# Patient Record
Sex: MALE | Race: WHITE
[De-identification: names, ages, dates, MRNs, and addresses within clinical notes are randomized per-mention and may not be internally consistent; named-entity substitution may affect disease eponyms.]

---

## 2019-07-14 ENCOUNTER — HOSPITAL ENCOUNTER (EMERGENCY)
Dept: HOSPITAL 74 - JER | Age: 59
Discharge: HOME | End: 2019-07-14
Payer: COMMERCIAL

## 2019-07-14 VITALS — SYSTOLIC BLOOD PRESSURE: 118 MMHG | HEART RATE: 71 BPM | DIASTOLIC BLOOD PRESSURE: 77 MMHG

## 2019-07-14 VITALS — BODY MASS INDEX: 30.4 KG/M2

## 2019-07-14 VITALS — TEMPERATURE: 98.6 F
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
## 2019-07-14 DIAGNOSIS — S89.82XA: Primary | ICD-10-CM
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
## 2019-07-14 DIAGNOSIS — S99.821A: ICD-10-CM

## 2019-07-14 LAB
ALBUMIN SERPL-MCNC: 3.2 G/DL (ref 3.4–5)
ALP SERPL-CCNC: 86 U/L (ref 45–117)
ALT SERPL-CCNC: 77 U/L (ref 13–61)
ANION GAP SERPL CALC-SCNC: 7 MMOL/L (ref 8–16)
AST SERPL-CCNC: 35 U/L (ref 15–37)
BASOPHILS # BLD: 0.4 % (ref 0–2)
BILIRUB SERPL-MCNC: 0.3 MG/DL (ref 0.2–1)
BUN SERPL-MCNC: 27.4 MG/DL (ref 7–18)
CALCIUM SERPL-MCNC: 8.3 MG/DL (ref 8.5–10.1)
CHLORIDE SERPL-SCNC: 108 MMOL/L (ref 98–107)
CO2 SERPL-SCNC: 28 MMOL/L (ref 21–32)
CREAT SERPL-MCNC: 1.2 MG/DL (ref 0.55–1.3)
DEPRECATED RDW RBC AUTO: 13.4 % (ref 11.9–15.9)
EOSINOPHIL # BLD: 5 % (ref 0–4.5)
GLUCOSE SERPL-MCNC: 110 MG/DL (ref 74–106)
HCT VFR BLD CALC: 47.3 % (ref 35.4–49)
HGB BLD-MCNC: 16 GM/DL (ref 11.7–16.9)
LYMPHOCYTES # BLD: 19 % (ref 8–40)
MAGNESIUM SERPL-MCNC: 2.3 MG/DL (ref 1.8–2.4)
MCH RBC QN AUTO: 29.7 PG (ref 25.7–33.7)
MCHC RBC AUTO-ENTMCNC: 33.9 G/DL (ref 32–35.9)
MCV RBC: 87.4 FL (ref 80–96)
MONOCYTES # BLD AUTO: 7.9 % (ref 3.8–10.2)
NEUTROPHILS # BLD: 67.7 % (ref 42.8–82.8)
PLATELET # BLD AUTO: 193 K/MM3 (ref 134–434)
PMV BLD: 7.7 FL (ref 7.5–11.1)
POTASSIUM SERPLBLD-SCNC: 4.1 MMOL/L (ref 3.5–5.1)
PROT SERPL-MCNC: 6.6 G/DL (ref 6.4–8.2)
RBC # BLD AUTO: 5.41 M/MM3 (ref 4–5.6)
SODIUM SERPL-SCNC: 142 MMOL/L (ref 136–145)
WBC # BLD AUTO: 7.3 K/MM3 (ref 4–10)

## 2019-07-14 NOTE — PDOC
History of Present Illness





- General


Chief Complaint: Injury


Stated Complaint: INJURY


Time Seen by Provider: 07/14/19 16:52


History Source: Patient


Exam Limitations: No Limitations





Past History





- Travel


Traveled outside of the country in the last 30 days: No


Close contact w/someone who was outside of country & ill: No





- Past Medical History


Allergies/Adverse Reactions: 


 Allergies











Allergy/AdvReac Type Severity Reaction Status Date / Time


 


No Known Allergies Allergy   Verified 07/14/19 16:20











Home Medications: 


Ambulatory Orders





Levothyroxine [Synthroid] 225 mcg PO DAILY 05/26/13 


Atorvastatin Ca [Lipitor] 20 mg PO HS 07/14/19 








COPD: No


Thyroid Disease: Yes





- Surgical History


Appendectomy: Yes





- Immunization History


Td Vaccination: Yes (05/26/13)





- Suicide/Smoking/Psychosocial Hx


Smoking Status: No


Smoking History: Never smoked


Number of Cigarettes Smoked Daily: 0





*Physical Exam





- Vital Signs


 Last Vital Signs











Temp Pulse Resp BP Pulse Ox


 


 98.6 F   83   18   125/77   98 


 


 07/14/19 16:19  07/14/19 16:19  07/14/19 16:19  07/14/19 16:19  07/14/19 16:19














ED Treatment Course





- LABORATORY


CBC & Chemistry Diagram: 


 07/14/19 17:39





 07/14/19 17:39





*DC/Admit/Observation/Transfer


Diagnosis at time of Disposition: 


 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





Knee pain, left


Qualifiers:


 Chronicity: acute Qualified Code(s): M25.562 - Pain in left knee





Fall


Qualifiers:


 Encounter type: initial encounter Qualified Code(s): W19.XXXA - Unspecified 

fall, initial encounter








- Discharge Dispostion


Disposition: HOME


Condition at time of disposition: Good


Decision to Admit order: No





- Referrals


Referrals: 


Paolo Almaguer MD [Primary Care Provider] - 


Woo Corado DO [Staff Physician] - 





- Patient Instructions


Printed Discharge Instructions:  DI for Knee Sprain, DI for Foot Sprain


Additional Instructions: 


You were seen in the ER today for right foot and left knee pain. The results of 

your labs and imaging today were normal and did not show any fractures. Please 

follow-up with your primary care doctor and orthopedic (Dr. Corado) within 1-2 

days to discuss your visit and make sure your symptoms have improved. Please 

return to the ER if you have any worsening pain, numbness/tingling/changes of 

color to your foot, development of fevers or chills, loss of consciousness, 

inability to tolerate food or fluids, or any other concerns.








- Post Discharge Activity


Forms/Work/School Notes:  Back to Work

## 2019-07-14 NOTE — PDOC
Documentation entered by Roly Estrada SCRIBE, acting as scribe for Dayana Salgado MD.








Dayana Salgado MD:  This documentation has been prepared by the Sean tariq Xhesika, SCRIBE, under my direction and personally reviewed by me in its 

entirety.  I confirm that the documentation accurately reflects all work, 

treatment, procedures, and medical decision making performed by me.  





Attending Attestation





- Resident


Resident Name: Mahogany Van





- HPI


HPI: 





07/14/19 18:12


The patient is a 59 year old male with a significant medical history of 

hypothyroidism and HLD who present to the ED with L knee and R foot pain s/p 

fall. The patient states he was at work walking through an aisle, got dizzy and 

fell. The patient states he was wearing steel toe boots, however, he hit his R 

foot after trying to catch his fall. The patient notes his dizziness has since 

self resolved. 


 


The patient denies chest pain, shortness of breath, headache. Denies fever, 

chills, nausea, vomiting, diarrhea and constipation. Denies dysuria, frequency, 

urgency and hematuria.





Allergies: NKDA, NKA


Past surgical history: Appendectomy


PCP: Paolo Tripathi 








- Physicial Exam


PE: 





07/14/19 18:36


WNWD 59-YEAR-OLD MALE WHO IS SEATED COMFORTABLY ON A GURNEY but has c/o rt foot 

pain and  left knee pain s/p fall at work


head ncat


neck no midline tendernss


lungs cta b/l


cvs [id number]


abd nontender


torso  no  flank pain ,no ribcage pain


extremities   there is mild swellingand tenderness to his rt big te and  dorsal 

aspect of his rt foot,good pulses,cap refill < 2 seconds


there is left knee tenderness but no patellar ballottment  , moving all 

extremities


skin warm and dry, no lacerations


neuro  axox4,ambulatory





07/14/19 18:47








- Medical Decision Making





07/14/19 19:10


RADIOGRAPHS REVIEWED AND THERE ARE NO ACUTE FRACTURES


pt d/c home amd will follow up w ortho if he has any persistent pain

## 2019-07-15 NOTE — EKG
Test Reason : 

Blood Pressure : ***/*** mmHG

Vent. Rate : 074 BPM     Atrial Rate : 074 BPM

   P-R Int : 166 ms          QRS Dur : 092 ms

    QT Int : 368 ms       P-R-T Axes : 028 -07 026 degrees

   QTc Int : 408 ms

 

NORMAL SINUS RHYTHM

NORMAL ECG

WHEN COMPARED WITH ECG OF 06-JUN-2005 19:57,

NO SIGNIFICANT CHANGE WAS FOUND

Confirmed by PARISA PARKER MD ([id number]) on 7/15/2019 9:58:56 AM

 

Referred By:             Confirmed By:PARISA PARKER MD

## 2021-02-26 ENCOUNTER — HOSPITAL ENCOUNTER (EMERGENCY)
Dept: HOSPITAL 74 - JER | Age: 61
Discharge: HOME | End: 2021-02-26
Payer: COMMERCIAL

## 2021-02-26 VITALS — TEMPERATURE: 98 F

## 2021-02-26 VITALS — SYSTOLIC BLOOD PRESSURE: 133 MMHG | DIASTOLIC BLOOD PRESSURE: 68 MMHG | HEART RATE: 72 BPM

## 2021-02-26 VITALS — BODY MASS INDEX: 33.7 KG/M2

## 2021-02-26 DIAGNOSIS — U07.1: Primary | ICD-10-CM

## 2021-02-26 LAB
ANION GAP SERPL CALC-SCNC: 3 MMOL/L (ref 8–16)
BUN SERPL-MCNC: 15.8 MG/DL (ref 7–18)
CALCIUM SERPL-MCNC: 8.1 MG/DL (ref 8.5–10.1)
CHLORIDE SERPL-SCNC: 106 MMOL/L (ref 98–107)
CO2 SERPL-SCNC: 31 MMOL/L (ref 21–32)
CREAT SERPL-MCNC: 1.1 MG/DL (ref 0.55–1.3)
DEPRECATED RDW RBC AUTO: 13.1 % (ref 11.9–15.9)
GLUCOSE SERPL-MCNC: 133 MG/DL (ref 74–106)
HCT VFR BLD CALC: 46 % (ref 35.4–49)
HGB BLD-MCNC: 15.6 GM/DL (ref 11.7–16.9)
MCH RBC QN AUTO: 29.2 PG (ref 25.7–33.7)
MCHC RBC AUTO-ENTMCNC: 33.9 G/DL (ref 32–35.9)
MCV RBC: 86.3 FL (ref 80–96)
PLATELET # BLD AUTO: 185 K/MM3 (ref 134–434)
PMV BLD: 7 FL (ref 7.5–11.1)
POTASSIUM SERPLBLD-SCNC: 4.7 MMOL/L (ref 3.5–5.1)
RBC # BLD AUTO: 5.33 M/MM3 (ref 4–5.6)
SODIUM SERPL-SCNC: 140 MMOL/L (ref 136–145)
WBC # BLD AUTO: 5 K/MM3 (ref 4–10)

## 2021-02-26 PROCEDURE — M0239 BAMLANIVIMAB-XXXX INFUSION: HCPCS
